# Patient Record
Sex: MALE | Race: BLACK OR AFRICAN AMERICAN
[De-identification: names, ages, dates, MRNs, and addresses within clinical notes are randomized per-mention and may not be internally consistent; named-entity substitution may affect disease eponyms.]

---

## 2019-11-15 ENCOUNTER — HOSPITAL ENCOUNTER (INPATIENT)
Dept: HOSPITAL 74 - YASAS | Age: 54
LOS: 4 days | Discharge: HOME | End: 2019-11-19
Attending: ALLERGY & IMMUNOLOGY | Admitting: ALLERGY & IMMUNOLOGY
Payer: COMMERCIAL

## 2019-11-15 VITALS — BODY MASS INDEX: 37.1 KG/M2

## 2019-11-15 DIAGNOSIS — E11.9: ICD-10-CM

## 2019-11-15 DIAGNOSIS — F14.20: ICD-10-CM

## 2019-11-15 DIAGNOSIS — F10.230: Primary | ICD-10-CM

## 2019-11-15 DIAGNOSIS — E66.9: ICD-10-CM

## 2019-11-15 DIAGNOSIS — E78.5: ICD-10-CM

## 2019-11-15 DIAGNOSIS — Z88.8: ICD-10-CM

## 2019-11-15 DIAGNOSIS — Z91.013: ICD-10-CM

## 2019-11-15 DIAGNOSIS — Z21: ICD-10-CM

## 2019-11-15 DIAGNOSIS — G47.00: ICD-10-CM

## 2019-11-15 DIAGNOSIS — J45.909: ICD-10-CM

## 2019-11-15 DIAGNOSIS — I10: ICD-10-CM

## 2019-11-15 DIAGNOSIS — Z79.84: ICD-10-CM

## 2019-11-15 DIAGNOSIS — F17.213: ICD-10-CM

## 2019-11-15 DIAGNOSIS — F25.9: ICD-10-CM

## 2019-11-15 LAB
ALBUMIN SERPL-MCNC: 3.7 G/DL (ref 3.4–5)
ALP SERPL-CCNC: 76 U/L (ref 45–117)
ALT SERPL-CCNC: 33 U/L (ref 13–61)
ANION GAP SERPL CALC-SCNC: 10 MMOL/L (ref 8–16)
AST SERPL-CCNC: 24 U/L (ref 15–37)
BILIRUB SERPL-MCNC: 0.4 MG/DL (ref 0.2–1)
BUN SERPL-MCNC: 10.5 MG/DL (ref 7–18)
CALCIUM SERPL-MCNC: 9 MG/DL (ref 8.5–10.1)
CHLORIDE SERPL-SCNC: 98 MMOL/L (ref 98–107)
CO2 SERPL-SCNC: 25 MMOL/L (ref 21–32)
CREAT SERPL-MCNC: 1.3 MG/DL (ref 0.55–1.3)
DEPRECATED RDW RBC AUTO: 15 % (ref 11.9–15.9)
GLUCOSE SERPL-MCNC: 195 MG/DL (ref 74–106)
HCT VFR BLD CALC: 38 % (ref 35.4–49)
HGB BLD-MCNC: 12.8 GM/DL (ref 11.7–16.9)
MCH RBC QN AUTO: 33.8 PG (ref 25.7–33.7)
MCHC RBC AUTO-ENTMCNC: 33.8 G/DL (ref 32–35.9)
MCV RBC: 100 FL (ref 80–96)
PLATELET # BLD AUTO: 243 K/MM3 (ref 134–434)
PMV BLD: 8.2 FL (ref 7.5–11.1)
POTASSIUM SERPLBLD-SCNC: 3.7 MMOL/L (ref 3.5–5.1)
PROT SERPL-MCNC: 7.4 G/DL (ref 6.4–8.2)
RBC # BLD AUTO: 3.8 M/MM3 (ref 4–5.6)
SODIUM SERPL-SCNC: 133 MMOL/L (ref 136–145)
WBC # BLD AUTO: 5.8 K/MM3 (ref 4–10)

## 2019-11-15 RX ADMIN — ATORVASTATIN CALCIUM SCH MG: 10 TABLET, FILM COATED ORAL at 21:12

## 2019-11-15 RX ADMIN — Medication SCH MG: at 21:12

## 2019-11-15 RX ADMIN — METFORMIN HYDROCHLORIDE SCH MG: 500 TABLET ORAL at 17:03
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
## 2019-11-16 RX ADMIN — Medication SCH MG: at 22:36

## 2019-11-16 RX ADMIN — HYDROCHLOROTHIAZIDE SCH MG: 12.5 CAPSULE ORAL at 10:05

## 2019-11-16 RX ADMIN — DIVALPROEX SODIUM SCH MG: 500 TABLET, DELAYED RELEASE ORAL at 10:56

## 2019-11-16 RX ADMIN — TRAZODONE HYDROCHLORIDE SCH MG: 100 TABLET ORAL at 22:36

## 2019-11-16 RX ADMIN — ASPIRIN 81 MG SCH MG: 81 TABLET ORAL at 10:05

## 2019-11-16 RX ADMIN — Medication SCH TAB: at 10:05

## 2019-11-16 RX ADMIN — ATORVASTATIN CALCIUM SCH MG: 10 TABLET, FILM COATED ORAL at 22:36

## 2019-11-16 RX ADMIN — METFORMIN HYDROCHLORIDE SCH MG: 500 TABLET ORAL at 06:06

## 2019-11-16 RX ADMIN — METFORMIN HYDROCHLORIDE SCH MG: 500 TABLET ORAL at 17:41

## 2019-11-16 NOTE — PN
S CIWA





- CIWA Score


Nausea/Vomitin-No Nausea/No Vomiting


Muscle Tremors: None


Anxiety: 2


Agitation: 0-Normal Activity


Paroxysmal Sweats: 3


Orientation: 0-Oriented


Tacttile Disturbances: 0-None


Auditory Disturbances: 0-None


Visual Disturbances: 0-None


Headache: 2-Mild


CIWA-Ar Total Score: 7





[facility] Progress Note (SOAP)


Subjective: 





c/o sweats, anxiety, and headache.


Objective: 





19 13:22


 Vital Signs











  19





  06:41 09:18


 


Temperature 98.0 F 97.5 F L


 


Pulse Rate 96 H 101 H


 


Respiratory 18 20





Rate  


 


Blood Pressure 120/79 115/77








 Laboratory Last Values











WBC  5.8 K/mm3 (4.0-10.0)   11/15/19  14:00    


 


RBC  3.80 M/mm3 (4.00-5.60)  L  11/15/19  14:00    


 


Hgb  12.8 GM/dL (11.7-16.9)   11/15/19  14:00    


 


Hct  38.0 % (35.4-49)   11/15/19  14:00    


 


MCV  100.0 fl (80-96)  H  11/15/19  14:00    


 


MCH  33.8 pg (25.7-33.7)  H  11/15/19  14:00    


 


MCHC  33.8 g/dl (32.0-35.9)   11/15/19  14:00    


 


RDW  15.0 % (11.9-15.9)   11/15/19  14:00    


 


Plt Count  243 K/MM3 (134-434)   11/15/19  14:00    


 


MPV  8.2 fl (7.5-11.1)   11/15/19  14:00    


 


Sodium  133 mmol/L (136-145)  L  11/15/19  14:00    


 


Potassium  3.7 mmol/L (3.5-5.1)   11/15/19  14:00    


 


Chloride  98 mmol/L ()   11/15/19  14:00    


 


Carbon Dioxide  25 mmol/L (21-32)   11/15/19  14:00    


 


Anion Gap  10 MMOL/L (8-16)   11/15/19  14:00    


 


BUN  10.5 mg/dL (7-18)   11/15/19  14:00    


 


Creatinine  1.3 mg/dL (0.55-1.3)   11/15/19  14:00    


 


Est GFR (CKD-EPI)AfAm  72.20   11/15/19  14:00    


 


Est GFR (CKD-EPI)NonAf  62.29   11/15/19  14:00    


 


POC Glucometer  189 UNITS ()   19  06:04    


 


Random Glucose  195 mg/dL ()  H  11/15/19  14:00    


 


Calcium  9.0 mg/dL (8.5-10.1)   11/15/19  14:00    


 


Total Bilirubin  0.4 mg/dL (0.2-1)   11/15/19  14:00    


 


AST  24 U/L (15-37)   11/15/19  14:00    


 


ALT  33 U/L (13-61)   11/15/19  14:00    


 


Alkaline Phosphatase  76 U/L ()   11/15/19  14:00    


 


Total Protein  7.4 g/dl (6.4-8.2)   11/15/19  14:00    


 


Albumin  3.7 g/dl (3.4-5.0)   11/15/19  14:00    


 


Valproic Acid  35.1 ug/mL ()  L  19  07:55    


 


RPR Titer  Nonreactive  (NONREACTIVE)   11/15/19  14:00    


 


HIV 1&2 Antibody Screen  Negative   11/15/19  14:05    


 


HIV P24 Antigen  Negative   11/15/19  14:05    








Labs noted.


Assessment: 





19 13:22


AOX3, in no acute respiratory distress.


Full ROM, ambulating in the unit.


Withdrawal symptoms.


Plan: 





continue detox.
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
## 2019-11-17 RX ADMIN — ASPIRIN 81 MG SCH MG: 81 TABLET ORAL at 10:10

## 2019-11-17 RX ADMIN — METFORMIN HYDROCHLORIDE SCH MG: 500 TABLET ORAL at 06:31

## 2019-11-17 RX ADMIN — DIVALPROEX SODIUM SCH MG: 500 TABLET, DELAYED RELEASE ORAL at 10:10

## 2019-11-17 RX ADMIN — HYDROCHLOROTHIAZIDE SCH MG: 12.5 CAPSULE ORAL at 10:10

## 2019-11-17 RX ADMIN — ATORVASTATIN CALCIUM SCH MG: 10 TABLET, FILM COATED ORAL at 21:15

## 2019-11-17 RX ADMIN — Medication SCH MG: at 21:15

## 2019-11-17 RX ADMIN — METFORMIN HYDROCHLORIDE SCH MG: 500 TABLET ORAL at 17:20

## 2019-11-17 RX ADMIN — Medication SCH TAB: at 10:10

## 2019-11-17 RX ADMIN — TRAZODONE HYDROCHLORIDE SCH MG: 100 TABLET ORAL at 21:15

## 2019-11-17 NOTE — PN
[facility] CIWA





- CIWA Score


Nausea/Vomitin-Mild Nausea/No Vomiting


Muscle Tremors: 3


Anxiety: 2


Agitation: 2


Paroxysmal Sweats: 2


Orientation: 1-Uncertain about Date


Tacttile Disturbances: 1-Very Mild Itch/Numbness


Auditory Disturbances: 0-None


Visual Disturbances: 0-None


Headache: 1-Very Mild


CIWA-Ar Total Score: 13





[facility] Progress Note (SOAP)


Subjective: 





54 years old male admitted on 11/15/19 for alcohol withdrawal sx management


treated with librium detox regimen





patient tolerated well


tremor sweating trouble sleep at night


ate breakfast social with peers in day room


Objective: 





19 09:34


 Vital Signs











Temperature  98 F   19 06:52


 


Pulse Rate  86   19 06:52


 


Respiratory Rate  20   19 06:52


 


Blood Pressure  121/71   19 06:52


 


O2 Sat by Pulse Oximetry (%)      








 Laboratory Last Values











WBC  5.8 K/mm3 (4.0-10.0)   11/15/19  14:00    


 


RBC  3.80 M/mm3 (4.00-5.60)  L  11/15/19  14:00    


 


Hgb  12.8 GM/dL (11.7-16.9)   11/15/19  14:00    


 


Hct  38.0 % (35.4-49)   11/15/19  14:00    


 


MCV  100.0 fl (80-96)  H  11/15/19  14:00    


 


MCH  33.8 pg (25.7-33.7)  H  11/15/19  14:00    


 


MCHC  33.8 g/dl (32.0-35.9)   11/15/19  14:00    


 


RDW  15.0 % (11.9-15.9)   11/15/19  14:00    


 


Plt Count  243 K/MM3 (134-434)   11/15/19  14:00    


 


MPV  8.2 fl (7.5-11.1)   11/15/19  14:00    


 


Sodium  133 mmol/L (136-145)  L  11/15/19  14:00    


 


Potassium  3.7 mmol/L (3.5-5.1)   11/15/19  14:00    


 


Chloride  98 mmol/L ()   11/15/19  14:00    


 


Carbon Dioxide  25 mmol/L (21-32)   11/15/19  14:00    


 


Anion Gap  10 MMOL/L (8-16)   11/15/19  14:00    


 


BUN  10.5 mg/dL (7-18)   11/15/19  14:00    


 


Creatinine  1.3 mg/dL (0.55-1.3)   11/15/19  14:00    


 


Est GFR (CKD-EPI)AfAm  72.20   11/15/19  14:00    


 


Est GFR (CKD-EPI)NonAf  62.29   11/15/19  14:00    


 


POC Glucometer  216 UNITS ()   19  05:28    


 


Random Glucose  195 mg/dL ()  H  11/15/19  14:00    


 


Calcium  9.0 mg/dL (8.5-10.1)   11/15/19  14:00    


 


Total Bilirubin  0.4 mg/dL (0.2-1)   11/15/19  14:00    


 


AST  24 U/L (15-37)   11/15/19  14:00    


 


ALT  33 U/L (13-61)   11/15/19  14:00    


 


Alkaline Phosphatase  76 U/L ()   11/15/19  14:00    


 


Total Protein  7.4 g/dl (6.4-8.2)   11/15/19  14:00    


 


Albumin  3.7 g/dl (3.4-5.0)   11/15/19  14:00    


 


Valproic Acid  35.1 ug/mL ()  L  19  07:55    


 


RPR Titer  Nonreactive  (NONREACTIVE)   11/15/19  14:00    


 


HIV 1&2 Antibody Screen  Negative   11/15/19  14:05    


 


HIV P24 Antigen  Negative   11/15/19  14:05    








lab noted


Assessment: 





19 09:34


alcohol withdrawal sx


Plan: 





continue librium detox regimen

## 2019-11-18 PROCEDURE — HZ2ZZZZ DETOXIFICATION SERVICES FOR SUBSTANCE ABUSE TREATMENT: ICD-10-PCS | Performed by: ALLERGY & IMMUNOLOGY

## 2019-11-18 RX ADMIN — DIVALPROEX SODIUM SCH MG: 500 TABLET, DELAYED RELEASE ORAL at 10:10

## 2019-11-18 RX ADMIN — METFORMIN HYDROCHLORIDE SCH MG: 500 TABLET ORAL at 17:03

## 2019-11-18 RX ADMIN — TRAZODONE HYDROCHLORIDE SCH MG: 100 TABLET ORAL at 21:09

## 2019-11-18 RX ADMIN — ATORVASTATIN CALCIUM SCH MG: 10 TABLET, FILM COATED ORAL at 21:09

## 2019-11-18 RX ADMIN — Medication SCH MG: at 21:09

## 2019-11-18 RX ADMIN — HYDROCHLOROTHIAZIDE SCH MG: 12.5 CAPSULE ORAL at 10:10

## 2019-11-18 RX ADMIN — ASPIRIN 81 MG SCH MG: 81 TABLET ORAL at 10:10

## 2019-11-18 RX ADMIN — METFORMIN HYDROCHLORIDE SCH MG: 500 TABLET ORAL at 07:06

## 2019-11-18 RX ADMIN — Medication SCH TAB: at 10:10

## 2019-11-18 NOTE — PN
[facility] Progress Note


Note: 





Psychiatry   


Attending's note (follow-up): 


Approached by the patient.


" I want my daytime seroquel dose ".


Patient gets anxious about missing daily dose.


Intervention :


Seroquel 50 mg po daily (first dose stat). 


Discussed with nurse.

## 2019-11-18 NOTE — PN
[facility] CIWA





- CIWA Score


Nausea/Vomitin-Mild Nausea/No Vomiting


Muscle Tremors: 2


Anxiety: 2


Agitation: 2


Paroxysmal Sweats: 1-Minimal Palms Moist


Orientation: 0-Oriented


Tacttile Disturbances: 0-None


Auditory Disturbances: 0-None


Visual Disturbances: 0-None


Headache: 0-None Present


CIWA-Ar Total Score: 8





[facility] Progress Note (SOAP)


Subjective: 





54 years old male admitted on 11/15/19 for alcohol withdrawal sx management


treated with librium detox regimen 


feeling better less tremor mild anxiety 


Objective: 





19 12:08


 Vital Signs











Temperature  97.5 F L  19 09:18


 


Pulse Rate  97 H  19 09:18


 


Respiratory Rate  20   19 09:18


 


Blood Pressure  131/83   19 09:18


 


O2 Sat by Pulse Oximetry (%)      








 Laboratory Last Values











WBC  5.8 K/mm3 (4.0-10.0)   11/15/19  14:00    


 


RBC  3.80 M/mm3 (4.00-5.60)  L  11/15/19  14:00    


 


Hgb  12.8 GM/dL (11.7-16.9)   11/15/19  14:00    


 


Hct  38.0 % (35.4-49)   11/15/19  14:00    


 


MCV  100.0 fl (80-96)  H  11/15/19  14:00    


 


MCH  33.8 pg (25.7-33.7)  H  11/15/19  14:00    


 


MCHC  33.8 g/dl (32.0-35.9)   11/15/19  14:00    


 


RDW  15.0 % (11.9-15.9)   11/15/19  14:00    


 


Plt Count  243 K/MM3 (134-434)   11/15/19  14:00    


 


MPV  8.2 fl (7.5-11.1)   11/15/19  14:00    


 


Sodium  133 mmol/L (136-145)  L  11/15/19  14:00    


 


Potassium  3.7 mmol/L (3.5-5.1)   11/15/19  14:00    


 


Chloride  98 mmol/L ()   11/15/19  14:00    


 


Carbon Dioxide  25 mmol/L (21-32)   11/15/19  14:00    


 


Anion Gap  10 MMOL/L (8-16)   11/15/19  14:00    


 


BUN  10.5 mg/dL (7-18)   11/15/19  14:00    


 


Creatinine  1.3 mg/dL (0.55-1.3)   11/15/19  14:00    


 


Est GFR (CKD-EPI)AfAm  72.20   11/15/19  14:00    


 


Est GFR (CKD-EPI)NonAf  62.29   11/15/19  14:00    


 


POC Glucometer  278 UNITS ()   19  08:19    


 


Random Glucose  195 mg/dL ()  H  11/15/19  14:00    


 


Calcium  9.0 mg/dL (8.5-10.1)   11/15/19  14:00    


 


Total Bilirubin  0.4 mg/dL (0.2-1)   11/15/19  14:00    


 


AST  24 U/L (15-37)   11/15/19  14:00    


 


ALT  33 U/L (13-61)   11/15/19  14:00    


 


Alkaline Phosphatase  76 U/L ()   11/15/19  14:00    


 


Total Protein  7.4 g/dl (6.4-8.2)   11/15/19  14:00    


 


Albumin  3.7 g/dl (3.4-5.0)   11/15/19  14:00    


 


Valproic Acid  35.1 ug/mL ()  L  19  07:55    


 


RPR Titer  Nonreactive  (NONREACTIVE)   11/15/19  14:00    


 


HIV 1&2 Antibody Screen  Negative   11/15/19  14:05    


 


HIV P24 Antigen  Negative   11/15/19  14:05    








lab noted


19 12:08


long history of diabetes 


Assessment: 





19 12:08


alcohol withdrawal sx


Plan: 





continue librium detox regimen

## 2019-11-19 VITALS — TEMPERATURE: 96.8 F | HEART RATE: 70 BPM | SYSTOLIC BLOOD PRESSURE: 101 MMHG | DIASTOLIC BLOOD PRESSURE: 56 MMHG

## 2019-11-19 RX ADMIN — HYDROCHLOROTHIAZIDE SCH MG: 12.5 CAPSULE ORAL at 09:21

## 2019-11-19 RX ADMIN — ASPIRIN 81 MG SCH MG: 81 TABLET ORAL at 09:20

## 2019-11-19 RX ADMIN — DIVALPROEX SODIUM SCH MG: 500 TABLET, DELAYED RELEASE ORAL at 09:20

## 2019-11-19 RX ADMIN — METFORMIN HYDROCHLORIDE SCH MG: 500 TABLET ORAL at 06:56

## 2019-11-19 RX ADMIN — Medication SCH TAB: at 09:21

## 2019-11-19 NOTE — DS
[facility] Detox Discharge Summary


Admission Date: 


11/15/19





Discharge Date: 19





- History


Present History: Alcohol Dependence


Additional Comments: 





54 years old male admitted on 11/15/19 for alcohol withdrawal sx management 

treated with librium detox regimen





patient is alert oriented x 3


respiratory clear lung bilaterally on auscultation 


skin warm and dry


abdomen soft round obese no rebound tenderness





- Physical Exam Results


Vital Signs: 


 Vital Signs











Temperature  96.8 F L  19 09:19


 


Pulse Rate  70   19 09:19


 


Respiratory Rate  20   19 09:19


 


Blood Pressure  101/56 L  19 09:19


 


O2 Sat by Pulse Oximetry (%)      











Pertinent Admission Physical Exam Findings: 





alcohol withdrawal sx


 Laboratory Last Values











WBC  5.8 K/mm3 (4.0-10.0)   11/15/19  14:00    


 


RBC  3.80 M/mm3 (4.00-5.60)  L  11/15/19  14:00    


 


Hgb  12.8 GM/dL (11.7-16.9)   11/15/19  14:00    


 


Hct  38.0 % (35.4-49)   11/15/19  14:00    


 


MCV  100.0 fl (80-96)  H  11/15/19  14:00    


 


MCH  33.8 pg (25.7-33.7)  H  11/15/19  14:00    


 


MCHC  33.8 g/dl (32.0-35.9)   11/15/19  14:00    


 


RDW  15.0 % (11.9-15.9)   11/15/19  14:00    


 


Plt Count  243 K/MM3 (134-434)   11/15/19  14:00    


 


MPV  8.2 fl (7.5-11.1)   11/15/19  14:00    


 


Sodium  133 mmol/L (136-145)  L  11/15/19  14:00    


 


Potassium  3.7 mmol/L (3.5-5.1)   11/15/19  14:00    


 


Chloride  98 mmol/L ()   11/15/19  14:00    


 


Carbon Dioxide  25 mmol/L (21-32)   11/15/19  14:00    


 


Anion Gap  10 MMOL/L (8-16)   11/15/19  14:00    


 


BUN  10.5 mg/dL (7-18)   11/15/19  14:00    


 


Creatinine  1.3 mg/dL (0.55-1.3)   11/15/19  14:00    


 


Est GFR (CKD-EPI)AfAm  72.20   11/15/19  14:00    


 


Est GFR (CKD-EPI)NonAf  62.29   11/15/19  14:00    


 


POC Glucometer  284 UNITS ()   19  05:24    


 


Random Glucose  195 mg/dL ()  H  11/15/19  14:00    


 


Calcium  9.0 mg/dL (8.5-10.1)   11/15/19  14:00    


 


Total Bilirubin  0.4 mg/dL (0.2-1)   11/15/19  14:00    


 


AST  24 U/L (15-37)   11/15/19  14:00    


 


ALT  33 U/L (13-61)   11/15/19  14:00    


 


Alkaline Phosphatase  76 U/L ()   11/15/19  14:00    


 


Total Protein  7.4 g/dl (6.4-8.2)   11/15/19  14:00    


 


Albumin  3.7 g/dl (3.4-5.0)   11/15/19  14:00    


 


Valproic Acid  35.1 ug/mL ()  L  19  07:55    


 


RPR Titer  Nonreactive  (NONREACTIVE)   11/15/19  14:00    


 


HIV 1&2 Antibody Screen  Negative   11/15/19  14:05    


 


HIV P24 Antigen  Negative   11/15/19  14:05    








lab noted 





- Treatment


Hospital Course: Detox Protocol Followed, Detoxed Safely, Responded well, 

Discharged Condition Good, Rehab Referral Accepted


Patient has Accepted a Rehab Referral to: [facility] out patient 





- Medication


Discharge Medications: 


Ambulatory Orders





traZODone HCL [Desyrel -] 100 mg PO HS 13 


Quetiapine Fumarate "Xr" [Seroquel XR] 200 mg PO HS #30 tablet 10/22/14 


Quetiapine Fumarate [Seroquel -] 100 mg PO DAILY #30 tab 10/22/14 


Albuterol Sulfate Inhaler - [Ventolin HFA Inhaler -] 2 inh PO Q4H PRN #1 inh 10/

24/14 


metFORMIN HCL [Glucophage -] 500 mg PO BID #30 tablet 10/24/14 


Aspirin [ASA -] 81 mg PO DAILY 11/15/19 


Divalproex *ER* [Depakote *ER* -] 500 mg PO DAILY 11/15/19 


Hydrochlorothiazide [Hctz -] 12.5 mg PO DAILY 11/15/19 


Multivitamin [Multiple Vitamins] 1 each PO DAILY 11/15/19 


Simvastatin 10 mg PO DAILY 11/15/19 











- Diagnosis


(1) Alcohol dependence


Current Visit: Yes   Status: Acute   





(2) Nicotine dependence


Current Visit: Yes   Status: Acute   


Qualifiers: 


   Nicotine product type: cigarettes   Substance use status: in withdrawal   

Qualified Code(s): F17.213 - Nicotine dependence, cigarettes, with withdrawal   





(3) Asthma


Current Visit: Yes   Status: Chronic   





(4) DM Diabetes mellitus type 2


Current Visit: Yes   Status: Chronic   





(5) Hyperlipidemia


Current Visit: No   Status: Active   





(6) HIV (human immunodeficiency virus infection)


Current Visit: Yes   Status: Chronic   


Qualifiers: 


   HIV symptom status: asymptomatic   Qualified Code(s): Z21 - Asymptomatic 

human immunodeficiency virus [HIV] infection status   





- AMA


Did Patient Leave Against Medical Advice: No





CIWA Score





- CIWA Score


Nausea/Vomitin-No Nausea/No Vomiting


Muscle Tremors: 2


Anxiety: 2


Agitation: 1-Slight > Activity


Paroxysmal Sweats: No Perspiration


Orientation: 0-Oriented


Tacttile Disturbances: 0-None


Auditory Disturbances: 0-None


Visual Disturbances: 0-None


Headache: 0-None Present


CIWA-Ar Total Score: 5